# Patient Record
Sex: FEMALE | Race: WHITE | Employment: UNEMPLOYED | URBAN - METROPOLITAN AREA
[De-identification: names, ages, dates, MRNs, and addresses within clinical notes are randomized per-mention and may not be internally consistent; named-entity substitution may affect disease eponyms.]

---

## 2022-01-17 ENCOUNTER — HOSPITAL ENCOUNTER (OUTPATIENT)
Dept: PREADMISSION TESTING | Age: 52
Discharge: HOME | End: 2022-01-17
Payer: COMMERCIAL

## 2022-01-17 PROCEDURE — U0003 INFECTIOUS AGENT DETECTION BY NUCLEIC ACID (DNA OR RNA); SEVERE ACUTE RESPIRATORY SYNDROME CORONAVIRUS 2 (SARS-COV-2) (CORONAVIRUS DISEASE [COVID-19]), AMPLIFIED PROBE TECHNIQUE, MAKING USE OF HIGH THROUGHPUT TECHNOLOGIES AS DESCRIBED BY CMS-2020-01-R: HCPCS

## 2022-01-18 LAB — SARS-COV-2, NAA: DETECTED

## 2022-03-25 ENCOUNTER — ANESTHESIA EVENT (OUTPATIENT)
Dept: ENDOSCOPY | Age: 52
End: 2022-03-25
Payer: COMMERCIAL

## 2022-03-25 ENCOUNTER — ANESTHESIA (OUTPATIENT)
Dept: ENDOSCOPY | Age: 52
End: 2022-03-25
Payer: COMMERCIAL

## 2022-03-25 ENCOUNTER — HOSPITAL ENCOUNTER (OUTPATIENT)
Age: 52
Setting detail: OUTPATIENT SURGERY
Discharge: HOME | End: 2022-03-25
Attending: SURGERY | Admitting: SURGERY
Payer: COMMERCIAL

## 2022-03-25 VITALS
OXYGEN SATURATION: 100 % | SYSTOLIC BLOOD PRESSURE: 96 MMHG | HEIGHT: 64 IN | BODY MASS INDEX: 28 KG/M2 | WEIGHT: 164 LBS | RESPIRATION RATE: 16 BRPM | HEART RATE: 62 BPM | TEMPERATURE: 96.9 F | DIASTOLIC BLOOD PRESSURE: 51 MMHG

## 2022-03-25 LAB — HCG SERPL QL: NEGATIVE

## 2022-03-25 PROCEDURE — 74011000250 HC RX REV CODE- 250: Performed by: ANESTHESIOLOGY

## 2022-03-25 PROCEDURE — 74011250636 HC RX REV CODE- 250/636: Performed by: SURGERY

## 2022-03-25 PROCEDURE — 36415 COLL VENOUS BLD VENIPUNCTURE: CPT

## 2022-03-25 PROCEDURE — 76040000007: Performed by: SURGERY

## 2022-03-25 PROCEDURE — 76060000032 HC ANESTHESIA 0.5 TO 1 HR: Performed by: SURGERY

## 2022-03-25 PROCEDURE — 2709999900 HC NON-CHARGEABLE SUPPLY: Performed by: SURGERY

## 2022-03-25 PROCEDURE — 84703 CHORIONIC GONADOTROPIN ASSAY: CPT

## 2022-03-25 PROCEDURE — 77030040361 HC SLV COMPR DVT MDII -B: Performed by: SURGERY

## 2022-03-25 PROCEDURE — 77030039961 HC KT CUST COLON BSC -D: Performed by: SURGERY

## 2022-03-25 PROCEDURE — 74011250636 HC RX REV CODE- 250/636: Performed by: ANESTHESIOLOGY

## 2022-03-25 RX ORDER — CITALOPRAM 10 MG/1
TABLET ORAL DAILY
Status: ON HOLD | COMMUNITY
End: 2022-03-25

## 2022-03-25 RX ORDER — FENTANYL CITRATE 50 UG/ML
25 INJECTION, SOLUTION INTRAMUSCULAR; INTRAVENOUS AS NEEDED
Status: CANCELLED | OUTPATIENT
Start: 2022-03-25

## 2022-03-25 RX ORDER — TOPIRAMATE 15 MG/1
25 CAPSULE, COATED PELLETS ORAL DAILY
COMMUNITY

## 2022-03-25 RX ORDER — SODIUM CHLORIDE 9 MG/ML
125 INJECTION, SOLUTION INTRAVENOUS CONTINUOUS
Status: DISCONTINUED | OUTPATIENT
Start: 2022-03-25 | End: 2022-03-25 | Stop reason: HOSPADM

## 2022-03-25 RX ORDER — SODIUM CHLORIDE, SODIUM LACTATE, POTASSIUM CHLORIDE, CALCIUM CHLORIDE 600; 310; 30; 20 MG/100ML; MG/100ML; MG/100ML; MG/100ML
75 INJECTION, SOLUTION INTRAVENOUS CONTINUOUS
Status: CANCELLED | OUTPATIENT
Start: 2022-03-25

## 2022-03-25 RX ORDER — ZOLPIDEM TARTRATE 10 MG/1
10 TABLET ORAL DAILY
COMMUNITY

## 2022-03-25 RX ORDER — METAXALONE 800 MG/1
800 TABLET ORAL AS NEEDED
COMMUNITY
Start: 2021-08-09

## 2022-03-25 RX ORDER — LIDOCAINE HYDROCHLORIDE 20 MG/ML
INJECTION, SOLUTION EPIDURAL; INFILTRATION; INTRACAUDAL; PERINEURAL AS NEEDED
Status: DISCONTINUED | OUTPATIENT
Start: 2022-03-25 | End: 2022-03-25 | Stop reason: HOSPADM

## 2022-03-25 RX ORDER — MELATONIN
1000 DAILY
COMMUNITY

## 2022-03-25 RX ORDER — GABAPENTIN 800 MG/1
TABLET ORAL
COMMUNITY
Start: 2021-08-10

## 2022-03-25 RX ORDER — SODIUM CHLORIDE 0.9 % (FLUSH) 0.9 %
5-40 SYRINGE (ML) INJECTION AS NEEDED
Status: CANCELLED | OUTPATIENT
Start: 2022-03-25

## 2022-03-25 RX ORDER — LEVOTHYROXINE SODIUM 50 UG/1
50 TABLET ORAL DAILY
COMMUNITY

## 2022-03-25 RX ORDER — SODIUM CHLORIDE 0.9 % (FLUSH) 0.9 %
5-40 SYRINGE (ML) INJECTION EVERY 8 HOURS
Status: CANCELLED | OUTPATIENT
Start: 2022-03-25

## 2022-03-25 RX ORDER — PROPOFOL 10 MG/ML
INJECTION, EMULSION INTRAVENOUS AS NEEDED
Status: DISCONTINUED | OUTPATIENT
Start: 2022-03-25 | End: 2022-03-25 | Stop reason: HOSPADM

## 2022-03-25 RX ORDER — CITALOPRAM 40 MG/1
40 TABLET, FILM COATED ORAL DAILY
COMMUNITY

## 2022-03-25 RX ORDER — MAGNESIUM SULFATE 100 %
4 CRYSTALS MISCELLANEOUS AS NEEDED
Status: CANCELLED | OUTPATIENT
Start: 2022-03-25

## 2022-03-25 RX ADMIN — PROPOFOL 20 MG: 10 INJECTION, EMULSION INTRAVENOUS at 11:44

## 2022-03-25 RX ADMIN — SODIUM CHLORIDE: 900 INJECTION, SOLUTION INTRAVENOUS at 11:42

## 2022-03-25 RX ADMIN — PROPOFOL 50 MG: 10 INJECTION, EMULSION INTRAVENOUS at 12:06

## 2022-03-25 RX ADMIN — PROPOFOL 30 MG: 10 INJECTION, EMULSION INTRAVENOUS at 11:47

## 2022-03-25 RX ADMIN — PROPOFOL 20 MG: 10 INJECTION, EMULSION INTRAVENOUS at 11:58

## 2022-03-25 RX ADMIN — PROPOFOL 50 MG: 10 INJECTION, EMULSION INTRAVENOUS at 11:39

## 2022-03-25 RX ADMIN — PROPOFOL 20 MG: 10 INJECTION, EMULSION INTRAVENOUS at 11:29

## 2022-03-25 RX ADMIN — PROPOFOL 50 MG: 10 INJECTION, EMULSION INTRAVENOUS at 11:34

## 2022-03-25 RX ADMIN — SODIUM CHLORIDE 125 ML/HR: 900 INJECTION, SOLUTION INTRAVENOUS at 09:17

## 2022-03-25 RX ADMIN — PROPOFOL 20 MG: 10 INJECTION, EMULSION INTRAVENOUS at 11:45

## 2022-03-25 RX ADMIN — PROPOFOL 50 MG: 10 INJECTION, EMULSION INTRAVENOUS at 12:09

## 2022-03-25 RX ADMIN — PROPOFOL 30 MG: 10 INJECTION, EMULSION INTRAVENOUS at 11:52

## 2022-03-25 RX ADMIN — PROPOFOL 30 MG: 10 INJECTION, EMULSION INTRAVENOUS at 11:49

## 2022-03-25 RX ADMIN — PROPOFOL 50 MG: 10 INJECTION, EMULSION INTRAVENOUS at 11:28

## 2022-03-25 RX ADMIN — PROPOFOL 50 MG: 10 INJECTION, EMULSION INTRAVENOUS at 11:37

## 2022-03-25 RX ADMIN — PROPOFOL 30 MG: 10 INJECTION, EMULSION INTRAVENOUS at 11:55

## 2022-03-25 RX ADMIN — PROPOFOL 30 MG: 10 INJECTION, EMULSION INTRAVENOUS at 11:31

## 2022-03-25 RX ADMIN — PROPOFOL 50 MG: 10 INJECTION, EMULSION INTRAVENOUS at 12:03

## 2022-03-25 RX ADMIN — PROPOFOL 50 MG: 10 INJECTION, EMULSION INTRAVENOUS at 11:42

## 2022-03-25 RX ADMIN — LIDOCAINE HYDROCHLORIDE 50 MG: 20 INJECTION, SOLUTION INTRAVENOUS at 11:28

## 2022-03-25 RX ADMIN — PROPOFOL 20 MG: 10 INJECTION, EMULSION INTRAVENOUS at 12:00

## 2022-03-25 NOTE — ANESTHESIA PREPROCEDURE EVALUATION
Relevant Problems   No relevant active problems       Anesthetic History   No history of anesthetic complications            Review of Systems / Medical History  Patient summary reviewed, nursing notes reviewed and pertinent labs reviewed    Pulmonary  Within defined limits                 Neuro/Psych   Within defined limits           Cardiovascular  Within defined limits                Exercise tolerance: >4 METS     GI/Hepatic/Renal  Within defined limits              Endo/Other        Arthritis     Other Findings              Physical Exam    Airway  Mallampati: I  TM Distance: > 6 cm  Neck ROM: normal range of motion   Mouth opening: Normal     Cardiovascular  Regular rate and rhythm,  S1 and S2 normal,  no murmur, click, rub, or gallop  Rhythm: regular  Rate: normal         Dental  No notable dental hx       Pulmonary  Breath sounds clear to auscultation               Abdominal  GI exam deferred       Other Findings            Anesthetic Plan    ASA: 1  Anesthesia type: MAC and total IV anesthesia          Induction: Intravenous  Anesthetic plan and risks discussed with: Patient

## 2022-03-25 NOTE — DISCHARGE INSTRUCTIONS
Desiree Murraydavid  [id number]  1970    COLON DISCHARGE INSTRUCTIONS    Discomfort:  Redness at IV site- apply warm compress to area; if redness or soreness persist- contact your physician  There may be a slight amount of blood passed from the rectum  Gaseous discomfort- walking, belching will help relieve any discomfort  You should not operate a vehicle for 12 hours  You should not engage in an occupation involving machinery or appliances for rest of today  You may not drink alcoholic beverages for at least 12 hours  Avoid making any critical decisions for at least 24 hour  DIET:   Regular or resume diet normally appropriate for you. - however -  remember your colon is empty and a heavy meal will produce gas. Avoid these foods:  vegetables, fried / greasy foods, carbonated drinks for today     ACTIVITY:  You may resume your normal daily activities it is recommended that you spend the remainder of the day resting -  avoid any strenuous activity. CALL M.D. ANY SIGN OF:   Increasing pain, nausea, vomiting  Abdominal distension (swelling)  New increased bleeding (oral or rectal)  Fever (chills)  Pain in chest area  Bloody discharge from nose or mouth  Shortness of breath      Follow-up Instructions:   If biopsies were taken, please call Dr. Lanette Dubose office in next 2 weeks, [phone]. Otherwise follow up with your PCM.         Desiree Jimenez  [id number]  1970        DISCHARGE SUMMARY from Nurse    The following personal items collected during your admission are returned to you:   Dental Appliance:    Vision:    Hearing Aid:    Jewelry:    Clothing:    Other Valuables:    Valuables sent to safe:         DISCHARGE SUMMARY from Nurse    The following personal items collected during your admission are returned to you:   Dental Appliance: Dental Appliances: None  Vision: Visual Aid: Glasses (at bedside with patient)  Hearing Aid:    Jewelry:    Clothing:    Other Valuables:    Valuables sent to safe: PATIENT INSTRUCTIONS:    After general anesthesia or intravenous sedation, for 24 hours or while taking prescription Narcotics:  · Limit your activities  · Do not drive and operate hazardous machinery  · Do not make important personal or business decisions  · Do  not drink alcoholic beverages  · If you have not urinated within 8 hours after discharge, please contact your surgeon on call. Report the following to your surgeon:  · Excessive pain, swelling, redness or odor of or around the surgical area  · Temperature over 100.5  · Nausea and vomiting lasting longer than 4 hours or if unable to take medications  · Any signs of decreased circulation or nerve impairment to extremity: change in color, persistent  numbness, tingling, coldness or increase pain  · Any questions      No orders of the defined types were placed in this encounter. What to do at Home:  Recommended activity: as above,     If you experience any of the following symptoms as above, please follow up with Dr. Janeth Negro. *  Please give a list of your current medications to your Primary Care Provider. *  Please update this list whenever your medications are discontinued, doses are      changed, or new medications (including over-the-counter products) are added. *  Please carry medication information at all times in case of emergency situations. These are general instructions for a healthy lifestyle:    No smoking/ No tobacco products/ Avoid exposure to second hand smoke    Surgeon General's Warning:  Quitting smoking now greatly reduces serious risk to your health.     Obesity, smoking, and sedentary lifestyle greatly increases your risk for illness    A healthy diet, regular physical exercise & weight monitoring are important for maintaining a healthy lifestyle    You may be retaining fluid if you have a history of heart failure or if you experience any of the following symptoms:  Weight gain of 3 pounds or more overnight or 5 pounds in a week, increased swelling in our hands or feet or shortness of breath while lying flat in bed. Please call your doctor as soon as you notice any of these symptoms; do not wait until your next office visit. Recognize signs and symptoms of STROKE:    F-face looks uneven    A-arms unable to move or move unevenly    S-speech slurred or non-existent    T-time-call 911 as soon as signs and symptoms begin-DO NOT go       Back to bed or wait to see if you get better-TIME IS BRAIN. The discharge information has been reviewed with the patient and caregiver. The patient and caregiver verbalized understanding. Warning Signs of HEART ATTACK     Call 911 if you have these symptoms:   Chest discomfort. Most heart attacks involve discomfort in the center of the chest that lasts more than a few minutes, or that goes away and comes back. It can feel like uncomfortable pressure, squeezing, fullness, or pain.  Discomfort in other areas of the upper body. Symptoms can include pain or discomfort in one or both arms, the back, neck, jaw, or stomach.  Shortness of breath with or without chest discomfort.  Other signs may include breaking out in a cold sweat, nausea, or lightheadedness. Don't wait more than five minutes to call 911 - MINUTES MATTER! Fast action can save your life. Calling 911 is almost always the fastest way to get lifesaving treatment. Emergency Medical Services staff can begin treatment when they arrive -- up to an hour sooner than if someone gets to the hospital by car. The discharge information has been reviewed with the patient and caregiver. The patient and caregiver verbalized understanding. Discharge medications reviewed with the patient and guardian and appropriate educational materials and side effects teaching were provided.     Patient armband removed and shredded

## 2022-03-25 NOTE — H&P
Assessment/Plan  # Detail Type Description    1. Assessment Encounter for screening colonoscopy (Z12.11). Patient Plan Pt is an average risk patient presenting for colonoscopy and colon cancer screening. Pt risk was determined based on NCCN guidelines V1.2016. Risks benefits and alternatives of colonoscopy were discussed to include FOBT, barium enema, CT virtual colonoscopy. Risk of perforation, bleeding or gas pain, missed polyps, inability to complete full colonoscopy, anesthesia complications discussed. Expectations of preoperative bowel prep /procedure described. Literature was given to the patient and reviewed. The patient showed understanding and agreed with the plan as above. Extended time was taken to answer all patient questions. Extra pre-procedure paperwork and counseling completed. Bowel prep prescription and directions provided. Pt has been educated on the bowel prep. Pt has been instructed that he is not to eat legumes, nuts, seeds or vegetables that are high in cellulose one week prior to the planned procedure. Pt should avoid red, purple and orange liquids, mackenzie, and juices    Provider Plan REFERRAL MADE TO DR. Troy Smith  PATIENT PREFERS TO BE CALLED DOE         2. Assessment Prediabetes (R73.03). Patient Plan MONITORED BY PCP         3. Assessment Fibromyalgia (M79.7). Patient Plan PAIN MANAGEMENT, NO NARCOTICS, ON GABAPENTIN         4. Assessment Full incontinence of feces (R15.9). Patient Plan HAS SACRAL NERVE STIMULATOR IN PLACE GOOD RESULTS         5. Assessment Body mass index (BMI) 28.0-28.9, adult (Z96.58). Plan Orders Today's instructions / counseling include(s) Lifestyle education regarding diet. This 54year old female presents for Colon Cancer Screen. History of Present Illness  1. Colon Cancer Screen   No prior screening. Denies risk factors. There are no associated symptoms. There are no pertinent negatives.   Additional information: No family history of colon cancer, No family history of Crohn's/colitis and NSAID/ASA use. Comments: Family History: Patient has no family history of colon cancer, diverticulitis or other colorectal diseases. HHISTORY OF SACRAL NERVE STIMULATOR FOR FECAL INCONTINENCE    Symptoms: Patient has no complaints of hematochezia, melena, no constipation, no diarrhea, no history of using laxatives or other stimulants. GI Complaints:  NONE    Prior GI Testing:  NONE    Hx of blood disorders:  NONE    Hx of cardiac disease:  HYPERCHOLESTEREMIA    Problems with anesthesia in the past:  NONE    Sleep apnea:  NO      HISTORY OF FIBROMYALGIA, ARTHRITIS BILATERAL HIPS, DENIES TOBACCO. Hearing loss:    Hx of tobacco:        Problem List  Problem List reviewed. Problem Description Onset Date Chronic Clinical Status Notes   Hypothyroidism  Y     Generalized anxiety disorder  Y     Insomnia  Y     Migraine  Y     Depression  Y     Arthralgia, unspecified joint  N     Prediabetes  Y         Past Medical/Surgical History   (Detailed)  Disease/disorder Onset Date Management Date Comments   Fecal incontinence  Neurostimulator implant 2019 DW 2019 -   Stim Implant 2019       Family History   (Detailed)    Relationship Family Member Name  Age at Death Condition Onset Age Cause of Death   Daughter    Anxiety  N   Daughter    Depression  N   Father    COPD  N   Father    Diabetes mellitus type 2  N   Maternal grandmother    Diabetes mellitus type 2  N   Maternal grandmother    Cancer, uterine  N   Maternal grandmother    Coronary artery disease  N   Mother    Depression  N   Mother    Cancer, lung 47 Y   Mother    Asthma  N   Paternal grandmother  Y  Coronary artery disease  Y     Social History  (Detailed)  Tobacco use reviewed. Preferred language is [place].       Education/Employment/Occupation  Employment History Status Retired Restrictions   [employer]        Marital Status/Family/Social Support  Marital status:      Children  Has children:  2 son(s). 2 daughter(s). Tobacco use status: Ex-cigarette smoker. Smoking status: Former smoker. Tobacco Screening  Patient has used tobacco.     Smoking Status  Type Smoking Status Usage Per Day Years Used Pack Years Total Pack Years   Cigarette Former smoker 1 Packs 6.00 6.00 6.00     Alcohol  There is a history of alcohol use. consumed occasionally. Caffeine  The patient uses caffeine: coffee and soda. - 1 cup a day. Medications (active prior to today)  Medication Instructions Start Date Stop Date Refilled Elsewhere   Mirena 20 mcg/24 hr (5 years) intrauterine device Inserted 2014 02/01/2014   N   TOPIRAMATE 25 MG TABLET TAKE 1 TABLET BY MOUTH EVERY DAY IN THE EVENING 03/10/2021  03/10/2021 N   VITAMIN D2 1.25MG(50,000 UNIT) TAKE ONE CAPSULE BY MOUTH EVERY WEEK 08/06/2021 08/06/2021 N   Ambien 10 mg tablet TAKE 1 TABLET BY MOUTH EVERYDAY AT BEDTIME 08/10/2021 11/22/2021 08/10/2021 N   citalopram 40 mg tablet TAKE 1 TABLET BY MOUTH EVERY DAY 10/19/2021  10/19/2021 N   levothyroxine 75 mcg tablet TAKE 1 TABLET BY MOUTH EVERY DAY 10/19/2021  10/19/2021 N   meclizine 25 mg tablet take 1 tablet by oral route 3 times every day as needed as needed for vertigo 10/29/2021   N   METAXALONE 800 MG TABLET TAKE 1 TABLET BY MOUTH EVERY DAY AS NEEDED 11/08/2021 11/08/2021 N   gabapentin 800 mg tablet TAKE 2 TABLETS BY MOUTH AT BEDTIME 11/15/2021  11/15/2021 N   amitriptyline 25 mg tablet take 1 tablet by oral route  every day at bedtime 11/21/2021 11/21/2021 N       Medication Reconciliation  Medications reconciled today.     Medication Reviewed  Adherence Medication Name Sig Desc Elsewhere Status   taking as directed Mirena 20 mcg/24 hr (5 years) intrauterine device Inserted 2014 N Verified   taking as directed TOPIRAMATE 25 MG TABLET TAKE 1 TABLET BY MOUTH EVERY DAY IN THE EVENING N Verified   taking as directed VITAMIN D2 1.25MG(50,000 UNIT) TAKE ONE CAPSULE BY MOUTH EVERY WEEK N Verified   taking as directed Ambien 10 mg tablet TAKE 1 TABLET BY MOUTH EVERYDAY AT BEDTIME N Verified   taking as directed citalopram 40 mg tablet TAKE 1 TABLET BY MOUTH EVERY DAY N Verified   taking as directed levothyroxine 75 mcg tablet TAKE 1 TABLET BY MOUTH EVERY DAY N Verified   taking as directed gabapentin 800 mg tablet TAKE 2 TABLETS BY MOUTH AT BEDTIME N Verified   taking as directed METAXALONE 800 MG TABLET TAKE 1 TABLET BY MOUTH EVERY DAY AS NEEDED N Verified   taking as directed meclizine 25 mg tablet take 1 tablet by oral route 3 times every day as needed as needed for vertigo N Verified   taking as directed amitriptyline 25 mg tablet take 1 tablet by oral route  every day at bedtime N Verified     Allergies  Ingredient Reaction (Severity) Medication Name Comment   NO KNOWN ALLERGIES        Reviewed, no changes. Review of Systems  System Neg/Pos Details   Constitutional Negative Fever and Night sweats. ENMT Negative Hearing loss, Tinnitus, Vertigo and Voice change. Eyes Negative Diplopia and Vision loss. Respiratory Negative Asthma, Cough, Dyspnea, Hemoptysis, Known TB exposure and Wheezing. Cardio Negative Chest pain, Claudication, Edema, Irregular heartbeat/palpitations and Thrombophlebitis. GI Negative Bloating, Dysphagia, Hemorrhoids, Jaundice and Reflux.  Negative Dysuria, Nocturia, Passage stone/gravel and Urinary incontinence. Endocrine Negative Cold intolerance and Goiter. Neuro Negative Focal weakness, Headache, Paresthesia, Seizures and Syncope. Integumentary Negative Change in shape/size of mole(s) and Skin lesion. MS Negative Back pain, Bone/joint symptoms and Muscle weakness. Hema/Lymph Negative Easy bleeding and Easy bruising. Allergic/Immuno Negative Contact allergy and Contact dermatitis.        Vital Signs   Height  Time ft in cm Last Measured Height Position   1:59 PM 5.0 4.00 162.56 11/22/2021 0 Weight/BSA/BMI  Time lb oz kg Context BMI kg/m2 BSA m2   1:59 .60  76.022 dressed with shoes 28.77      Blood Pressure  Time BP mm/Hg Position Side Site Method Cuff Size   1:59 /70 sitting left arm manual adult     Temperature/Pulse/Respiration  Time Temp F Temp C Temp Site Pulse/min Pattern Resp/ min   1:59 PM 97.40 36.33  84 regular      Pulse Oximetry/FIO2  Time Pulse Ox (Rest %) Pulse Ox (Amb %) O2 Sat O2 L/Min Timing FiO2 % L/min Delivery Method Finger Probe   1:59 PM 98  RA      R Index     Measured by  Time Measured by   1:59 PM Andrey Fuentes     Physical Exam  Exam Findings Details   Constitutional Normal Well developed. Eyes Normal Conjunctiva - Right: Normal, Left: Normal. Pupil - Right: Normal, Left: Normal.   Ears Normal Inspection - Right: Normal, Left: Normal. Hearing - Right: Normal, Left: Normal.   Nose/Mouth/Throat Normal External nose - Normal. Lips/teeth/gums - Normal. Oropharynx - Normal.   Neck Exam Normal Inspection - Normal. Palpation - Normal. Thyroid gland - Normal.   Lymph Detail Normal No cervical, supraclavicular, or axillary adenopathy. Respiratory Normal Inspection - Normal. Auscultation - Normal. Effort - Normal.   Cardiovascular Normal Regular rate and rhythm. No murmurs, gallops, or rubs. Vascular Normal Capillary refill - Less than 2 seconds. Abdomen Normal Inspection - Normal. No abdominal tenderness. No hepatic enlargement. No spleen enlargement. No hernia. Genitourinary Normal No hernia. Skin Normal Inspection - Normal.   Musculoskeletal Normal Visual overview of all four extremities is normal.   Extremity Normal No edema. Neurological Normal Memory - Normal. Cranial nerves - Cranial nerves I grossly intact, Cranial nerves II through XII grossly intact. Psychiatric Normal Orientation - Oriented to time, place, person & situation. Appropriate mood and affect. Normal insight. Normal judgment.      Immunizations Entered by History  Date Immunization 6/7/2021 12:00:00 AM LUISA COVID-19 VACCINE (EUA)         Medications (added, continued, or stopped this visit)  Start Date Medication Directions PRN Status PRN Reason Instruction Stop Date   08/10/2021 Ambien 10 mg tablet TAKE 1 TABLET BY MOUTH EVERYDAY AT BEDTIME N   11/22/2021 11/21/2021 amitriptyline 25 mg tablet take 1 tablet by oral route  every day at bedtime N      10/19/2021 citalopram 40 mg tablet TAKE 1 TABLET BY MOUTH EVERY DAY N      11/15/2021 gabapentin 800 mg tablet TAKE 2 TABLETS BY MOUTH AT BEDTIME N      10/19/2021 levothyroxine 75 mcg tablet TAKE 1 TABLET BY MOUTH EVERY DAY N      10/29/2021 meclizine 25 mg tablet take 1 tablet by oral route 3 times every day as needed as needed for vertigo Y vertigo     11/08/2021 METAXALONE 800 MG TABLET TAKE 1 TABLET BY MOUTH EVERY DAY AS NEEDED N      02/01/2014 Mirena 20 mcg/24 hr (5 years) intrauterine device Inserted 2014 N      03/10/2021 TOPIRAMATE 25 MG TABLET TAKE 1 TABLET BY MOUTH EVERY DAY IN THE EVENING N      08/06/2021 VITAMIN D2 1.25MG(50,000 UNIT) TAKE ONE CAPSULE BY MOUTH EVERY WEEK N        Active Patient Care Team Members  Name Contact Agency Type Support Role Relationship Active Date Inactive Date Specialty   [facility]   Emergency Contact Child, Mother is the Patient      Redd Encinas   encounter provider    [address] [facility]   Patient provider PCP   Family Practice   Joshua Mcginnis   encounter provider    Physical Therapy

## 2022-03-25 NOTE — ANESTHESIA POSTPROCEDURE EVALUATION
Procedure(s):  COLONOSCOPY WITH MAC. MAC, total IV anesthesia    Anesthesia Post Evaluation      Multimodal analgesia: multimodal analgesia not used between 6 hours prior to anesthesia start to PACU discharge  Patient location during evaluation: PACU  Patient participation: complete - patient participated  Level of consciousness: awake and alert  Pain score: 0  Pain management: adequate  Airway patency: patent  Anesthetic complications: no  Cardiovascular status: acceptable  Respiratory status: acceptable  Hydration status: acceptable  Post anesthesia nausea and vomiting:  none  Final Post Anesthesia Temperature Assessment:  Normothermia (36.0-37.5 degrees C)      INITIAL Post-op Vital signs:   Vitals Value Taken Time   BP 96/51 03/25/22 1232   Temp 36.1 °C (96.9 °F) 03/25/22 1219   Pulse 62 03/25/22 1232   Resp 16 03/25/22 1232   SpO2 100 % 03/25/22 1240   Vitals shown include unvalidated device data.

## (undated) DEVICE — TRAP SPEC COLL POLYP POLYSTYR --

## (undated) DEVICE — MAJ-1414 SINGLE USE ADPATER BIOPSY VALV: Brand: SINGLE USE ADAPTOR BIOPSY VALVE

## (undated) DEVICE — TUBING, SUCTION, 1/4" X 12', STRAIGHT: Brand: MEDLINE

## (undated) DEVICE — SYR 5ML 1/5 GRAD LL NSAF LF --

## (undated) DEVICE — SOLUTION IV 500ML 0.9% SOD CHL FLX CONT

## (undated) DEVICE — GARMENT,MEDLINE,DVT,INT,CALF,MED, GEN2: Brand: MEDLINE

## (undated) DEVICE — SINGLE PORT MANIFOLD: Brand: NEPTUNE 2

## (undated) DEVICE — Device

## (undated) DEVICE — NDL FLTR TIP 5 MIC 18GX1.5IN --

## (undated) DEVICE — CANNULA CUSH AD W/ 14FT TBG

## (undated) DEVICE — TRNQT TEXT 1X18IN BLU LF DISP -- CONVERT TO ITEM 362165

## (undated) DEVICE — SYR 3ML LL TIP 1/10ML GRAD --

## (undated) DEVICE — CATH SUC CTRL PRT TRIFLO 14FR --

## (undated) DEVICE — SET ADMIN 16ML TBNG L100IN 2 Y INJ SITE IV PIGGY BK DISP

## (undated) DEVICE — WRISTBAND ID AD W2.5XL9.5CM RED VYN ADH CLSR UNI-PRINT

## (undated) DEVICE — NDL PRT INJ NSAF BLNT 18GX1.5 --

## (undated) DEVICE — KENDALL RADIOLUCENT FOAM MONITORING ELECTRODE RECTANGULAR SHAPE: Brand: KENDALL

## (undated) DEVICE — SPONGE GZ W4XL4IN COT 12 PLY TYP VII WVN C FLD DSGN

## (undated) DEVICE — CATH IV SAFE STR 22GX1IN BLU -- PROTECTIV PLUS

## (undated) DEVICE — SPONGE GZ W4XL4IN RAYON POLY 4 PLY NONWOVEN FASTER WICKING

## (undated) DEVICE — SYRINGE 50ML E/T